# Patient Record
Sex: FEMALE | Race: WHITE | NOT HISPANIC OR LATINO | Employment: FULL TIME | URBAN - METROPOLITAN AREA
[De-identification: names, ages, dates, MRNs, and addresses within clinical notes are randomized per-mention and may not be internally consistent; named-entity substitution may affect disease eponyms.]

---

## 2025-01-27 ENCOUNTER — TELEPHONE (OUTPATIENT)
Dept: PRIMARY CARE | Facility: CLINIC | Age: 45
End: 2025-01-27
Payer: COMMERCIAL

## 2025-01-27 ENCOUNTER — PATIENT MESSAGE (OUTPATIENT)
Dept: PRIMARY CARE | Facility: CLINIC | Age: 45
End: 2025-01-27
Payer: COMMERCIAL

## 2025-01-27 DIAGNOSIS — B00.1 COLD SORE: Primary | ICD-10-CM

## 2025-01-27 RX ORDER — VALACYCLOVIR HYDROCHLORIDE 1 G/1
2000 TABLET, FILM COATED ORAL 2 TIMES DAILY
Qty: 4 TABLET | Refills: 0 | Status: SHIPPED | OUTPATIENT
Start: 2025-01-27 | End: 2025-01-28

## 2025-02-03 ENCOUNTER — PATIENT MESSAGE (OUTPATIENT)
Dept: PRIMARY CARE | Facility: CLINIC | Age: 45
End: 2025-02-03
Payer: COMMERCIAL

## 2025-02-03 DIAGNOSIS — B00.9 HSV-1 INFECTION: Primary | ICD-10-CM

## 2025-02-21 RX ORDER — VALACYCLOVIR HYDROCHLORIDE 1 G/1
1000 TABLET, FILM COATED ORAL 2 TIMES DAILY
Qty: 4 TABLET | Refills: 0 | Status: SHIPPED | OUTPATIENT
Start: 2025-02-21 | End: 2025-02-23

## 2025-04-11 ENCOUNTER — APPOINTMENT (OUTPATIENT)
Dept: PRIMARY CARE | Facility: CLINIC | Age: 45
End: 2025-04-11
Payer: COMMERCIAL

## 2025-04-11 VITALS — HEART RATE: 78 BPM | HEIGHT: 65 IN | BODY MASS INDEX: 25.66 KG/M2 | OXYGEN SATURATION: 98 % | WEIGHT: 154 LBS

## 2025-04-11 DIAGNOSIS — E66.811 CLASS 1 OBESITY DUE TO EXCESS CALORIES WITH SERIOUS COMORBIDITY AND BODY MASS INDEX (BMI) OF 33.0 TO 33.9 IN ADULT: ICD-10-CM

## 2025-04-11 DIAGNOSIS — E66.09 CLASS 1 OBESITY DUE TO EXCESS CALORIES WITH SERIOUS COMORBIDITY AND BODY MASS INDEX (BMI) OF 33.0 TO 33.9 IN ADULT: ICD-10-CM

## 2025-04-11 DIAGNOSIS — E55.9 VITAMIN D DEFICIENCY: ICD-10-CM

## 2025-04-11 DIAGNOSIS — Z12.39 BREAST SCREENING: ICD-10-CM

## 2025-04-11 DIAGNOSIS — Z13.220 LIPID SCREENING: ICD-10-CM

## 2025-04-11 DIAGNOSIS — Z91.89 AT RISK FOR SEXUALLY TRANSMITTED DISEASE DUE TO PARTNER WITH MULTIPLE PARTNERS: ICD-10-CM

## 2025-04-11 DIAGNOSIS — Z13.220 SCREENING FOR LIPOID DISORDERS: ICD-10-CM

## 2025-04-11 DIAGNOSIS — Z13.29 SCREENING FOR THYROID DISORDER: ICD-10-CM

## 2025-04-11 DIAGNOSIS — Z13.0 SCREENING FOR DEFICIENCY ANEMIA: ICD-10-CM

## 2025-04-11 DIAGNOSIS — Z71.3 WEIGHT LOSS COUNSELING, ENCOUNTER FOR: ICD-10-CM

## 2025-04-11 DIAGNOSIS — Z13.1 DIABETES MELLITUS SCREENING: ICD-10-CM

## 2025-04-11 DIAGNOSIS — Z00.00 WELLNESS EXAMINATION: Primary | ICD-10-CM

## 2025-04-11 PROCEDURE — 1036F TOBACCO NON-USER: CPT

## 2025-04-11 PROCEDURE — 99396 PREV VISIT EST AGE 40-64: CPT

## 2025-04-11 PROCEDURE — 3008F BODY MASS INDEX DOCD: CPT

## 2025-04-11 ASSESSMENT — ENCOUNTER SYMPTOMS
SINUS PAIN: 0
CONSTIPATION: 0
UNEXPECTED WEIGHT CHANGE: 0
RHINORRHEA: 0
LIGHT-HEADEDNESS: 0
ABDOMINAL PAIN: 0
HEMATURIA: 0
FACIAL ASYMMETRY: 0
PSYCHIATRIC NEGATIVE: 1
NAUSEA: 0
FATIGUE: 0
JOINT SWELLING: 0
TROUBLE SWALLOWING: 0
DYSURIA: 0
WOUND: 0
WHEEZING: 0
SORE THROAT: 0
ARTHRALGIAS: 0
SINUS PRESSURE: 0
BACK PAIN: 0
FEVER: 0
WEAKNESS: 0
PALPITATIONS: 0
COUGH: 0
SHORTNESS OF BREATH: 0
SEIZURES: 0

## 2025-04-11 ASSESSMENT — PATIENT HEALTH QUESTIONNAIRE - PHQ9
1. LITTLE INTEREST OR PLEASURE IN DOING THINGS: NOT AT ALL
SUM OF ALL RESPONSES TO PHQ9 QUESTIONS 1 AND 2: 0
2. FEELING DOWN, DEPRESSED OR HOPELESS: NOT AT ALL

## 2025-04-11 NOTE — PROGRESS NOTES
Subjective   Patient ID: Ramses Tinoco is a 45 y.o. female who presents for Annual Exam (Annual CPE/Discuss STI/STD testing no symptoms just due to multiple partners /148/89).    Subjective  Ramses Chamorro is a 44 y.o. female here for discussion regarding weight loss. She has noted a weight gain of approximately 10 pounds over the last 2 years. She feels ideal weight is 145 pounds. Weight at graduation from high school was 150 pounds. History of eating disorders: none. There is a family history positive for obesity in the patient. Previous treatments for obesity include self-directed dieting. Obesity associated medical conditions: hypertension. Obesity associated medications: none. Cardiovascular risk factors besides obesity: hypertension and obesity (BMI >= 30 kg/m2).     Objective  Body mass index is 33.8 kg/m².  4/3/24-200lb  7/16/24-168lb  10/22/24-157lb  4/11/25-154lb    Pt is here for annual wellness.  Reports overall health is good    Do you take any herbs or supplements that were not prescribed by a doctor? Cinnamon, air nail skin  Are you taking calcium supplements? no  Are you taking aspirin daily? no  Colonoscopy: uptodate  Fasting blood work: ordered today  Last eye exam: forever  Last dental Exam: need to go  Exercise:not really  Mood:pleasant  Sleep: good  Diet:  well rounded  Occupation:  deliver mail  Do you have pain that bothers you in your daily life? no    1. Patient Counseling:  --Nutrition: Stressed importance of moderation in sodium/caffeine intake, saturated fat and cholesterol, caloric balance, sufficient intake of fresh fruits, vegetables, fiber, calcium, iron, and 1 mg of folate supplement per day (for females capable of pregnancy).  --Discussed the issue of estrogen replacement, calcium supplement, and the daily use of baby aspirin as appropriate per pt.  --Exercise: Stressed the importance of regular exercise.   --Substance Abuse: Discussed cessation/primary prevention of tobacco,  "alcohol, or other drug use; driving or other dangerous activities under the influence; availability of treatment for abuse.    --Sexuality: Discussed sexually transmitted diseases, partner selection, use of condoms, avoidance of unintended pregnancy  and contraceptive alternatives.   --Injury prevention: Discussed safety belts, safety helmets, smoke detector, smoking near bedding or upholstery.   --Dental health: Discussed importance of regular tooth brushing, flossing, and dental visits.  --Immunizations reviewed.  --Discussed benefits of colon cancer screening.  --After hours service discussed with patient  2 Discussed the patient's BMI.  The BMI is above average. The patient received Provided instructions on dietary changes  Provided instructions on exercise because they have an above normal BMI.  3 Follow up as needed for acute illness             Review of Systems   Constitutional:  Negative for fatigue, fever and unexpected weight change.   HENT:  Negative for congestion, ear discharge, ear pain, nosebleeds, postnasal drip, rhinorrhea, sinus pressure, sinus pain, sneezing, sore throat and trouble swallowing.    Respiratory:  Negative for cough, shortness of breath and wheezing.    Cardiovascular:  Negative for chest pain, palpitations and leg swelling.   Gastrointestinal:  Negative for abdominal pain, constipation and nausea.   Genitourinary:  Negative for dysuria, hematuria, menstrual problem, pelvic pain, vaginal bleeding and vaginal pain.   Musculoskeletal:  Negative for arthralgias, back pain, gait problem and joint swelling.   Skin:  Negative for rash and wound.   Neurological:  Negative for seizures, facial asymmetry, weakness and light-headedness.   Psychiatric/Behavioral: Negative.         Objective   Pulse 78   Ht 1.638 m (5' 4.5\")   Wt 69.9 kg (154 lb)   SpO2 98%   BMI 26.03 kg/m²     Physical Exam  Constitutional:       Appearance: Normal appearance.   HENT:      Head: Normocephalic and " atraumatic.      Right Ear: Tympanic membrane and external ear normal.      Left Ear: Tympanic membrane and external ear normal.      Nose: Nose normal.      Mouth/Throat:      Mouth: Mucous membranes are moist.      Pharynx: Oropharynx is clear. Uvula midline.   Eyes:      General: Lids are normal.      Extraocular Movements: Extraocular movements intact.      Pupils: Pupils are equal, round, and reactive to light.   Neck:      Thyroid: No thyromegaly or thyroid tenderness.   Cardiovascular:      Rate and Rhythm: Normal rate and regular rhythm.      Heart sounds: Normal heart sounds.   Pulmonary:      Effort: Pulmonary effort is normal.      Breath sounds: Normal breath sounds.   Abdominal:      General: Bowel sounds are normal.      Palpations: Abdomen is soft.      Tenderness: There is no abdominal tenderness. There is no guarding.   Musculoskeletal:         General: No swelling. Normal range of motion.      Cervical back: Normal range of motion.      Right lower leg: No edema.      Left lower leg: No edema.   Lymphadenopathy:      Head:      Right side of head: No submental, submandibular or tonsillar adenopathy.      Left side of head: No submental, submandibular or tonsillar adenopathy.      Cervical: No cervical adenopathy.   Skin:     General: Skin is warm and dry.      Capillary Refill: Capillary refill takes less than 2 seconds.      Coloration: Skin is not jaundiced.      Findings: No lesion or rash.   Neurological:      General: No focal deficit present.      Mental Status: She is alert and oriented to person, place, and time.   Psychiatric:         Mood and Affect: Mood normal.         Behavior: Behavior normal.         Thought Content: Thought content normal.         Judgment: Judgment normal.         Assessment/Plan   Ramses was seen today for annual exam.  Diagnoses and all orders for this visit:  Wellness examination  Screening for deficiency anemia  -     CBC; Future  -     CBC  Diabetes mellitus  screening  -     Comprehensive Metabolic Panel; Future  -     Comprehensive Metabolic Panel  Screening for thyroid disorder  -     TSH with reflex to Free T4 if abnormal; Future  -     TSH with reflex to Free T4 if abnormal  Lipid screening  -     Lipid Panel; Future  -     Lipid Panel  Screening for lipoid disorders  -     Lipid Panel; Future  -     Lipid Panel  Vitamin D deficiency  -     Vitamin D 25-Hydroxy,Total (for eval of Vitamin D levels); Future  -     Vitamin D 25-Hydroxy,Total (for eval of Vitamin D levels)  Breast screening  -     BI mammo bilateral screening tomosynthesis; Future  At risk for sexually transmitted disease due to partner with multiple partners  -     HIV 1/2 Antigen/Antibody Screen with Reflex to Confirmation; Future  -     Hepatitis C Antibody; Future  -     Syphilis Screen with Reflex; Future  -     C. trachomatis / N. gonorrhoeae, Amplified, Urogenital; Future  -     HIV 1/2 Antigen/Antibody Screen with Reflex to Confirmation  -     Hepatitis C Antibody  -     Syphilis Screen with Reflex  -     C. trachomatis / N. gonorrhoeae, Amplified, Urogenital  Class 1 obesity due to excess calories with serious comorbidity and body mass index (BMI) of 33.0 to 33.9 in adult  BMI 33.0-33.9,adult  Weight loss counseling, encounter for  Comments:  continue zepbound at current dose

## 2025-04-11 NOTE — LETTER
April 11, 2025     Patient: Ramses Tinoco   YOB: 1980   Date of Visit: 4/11/2025       To Whom It May Concern:    Ramses Tinoco was seen in my clinic on 4/11/2025 at 11:20 am. Please excuse Ramses for her absence from work on this day to make the appointment.    If you have any questions or concerns, please don't hesitate to call.         Sincerely,         Radha Glez, JONATAN-CNP        CC: No Recipients

## 2025-04-13 LAB
25(OH)D3+25(OH)D2 SERPL-MCNC: 30 NG/ML (ref 30–100)
ALBUMIN SERPL-MCNC: 4.7 G/DL (ref 3.6–5.1)
ALP SERPL-CCNC: 62 U/L (ref 31–125)
ALT SERPL-CCNC: 10 U/L (ref 6–29)
ANION GAP SERPL CALCULATED.4IONS-SCNC: 10 MMOL/L (CALC) (ref 7–17)
AST SERPL-CCNC: 18 U/L (ref 10–35)
BILIRUB SERPL-MCNC: 0.7 MG/DL (ref 0.2–1.2)
BUN SERPL-MCNC: 14 MG/DL (ref 7–25)
C TRACH RRNA SPEC QL NAA+PROBE: NOT DETECTED
CALCIUM SERPL-MCNC: 9.1 MG/DL (ref 8.6–10.2)
CHLORIDE SERPL-SCNC: 103 MMOL/L (ref 98–110)
CHOLEST SERPL-MCNC: 202 MG/DL
CHOLEST/HDLC SERPL: 2.2 (CALC)
CO2 SERPL-SCNC: 25 MMOL/L (ref 20–32)
CREAT SERPL-MCNC: 0.6 MG/DL (ref 0.5–0.99)
EGFRCR SERPLBLD CKD-EPI 2021: 112 ML/MIN/1.73M2
ERYTHROCYTE [DISTWIDTH] IN BLOOD BY AUTOMATED COUNT: 12.1 % (ref 11–15)
GLUCOSE SERPL-MCNC: 90 MG/DL (ref 65–99)
HCT VFR BLD AUTO: 42.5 % (ref 35–45)
HCV AB SERPL QL IA: NORMAL
HDLC SERPL-MCNC: 90 MG/DL
HGB BLD-MCNC: 14 G/DL (ref 11.7–15.5)
HIV 1+2 AB+HIV1 P24 AG SERPL QL IA: NORMAL
LDLC SERPL CALC-MCNC: 98 MG/DL (CALC)
MCH RBC QN AUTO: 29.7 PG (ref 27–33)
MCHC RBC AUTO-ENTMCNC: 32.9 G/DL (ref 32–36)
MCV RBC AUTO: 90 FL (ref 80–100)
N GONORRHOEA RRNA SPEC QL NAA+PROBE: NOT DETECTED
NONHDLC SERPL-MCNC: 112 MG/DL (CALC)
PLATELET # BLD AUTO: 261 THOUSAND/UL (ref 140–400)
PMV BLD REES-ECKER: 10.3 FL (ref 7.5–12.5)
POTASSIUM SERPL-SCNC: 3.7 MMOL/L (ref 3.5–5.3)
PROT SERPL-MCNC: 7 G/DL (ref 6.1–8.1)
QUEST GC CT AMPLIFIED (ALWAYS MESSAGE): NORMAL
RBC # BLD AUTO: 4.72 MILLION/UL (ref 3.8–5.1)
SODIUM SERPL-SCNC: 138 MMOL/L (ref 135–146)
T PALLIDUM AB SER QL IA: NORMAL
TRIGL SERPL-MCNC: 59 MG/DL
TSH SERPL-ACNC: 0.53 MIU/L
WBC # BLD AUTO: 7.6 THOUSAND/UL (ref 3.8–10.8)

## 2025-04-16 LAB
25(OH)D3+25(OH)D2 SERPL-MCNC: 30 NG/ML (ref 30–100)
ALBUMIN SERPL-MCNC: 4.7 G/DL (ref 3.6–5.1)
ALP SERPL-CCNC: 62 U/L (ref 31–125)
ALT SERPL-CCNC: 10 U/L (ref 6–29)
ANION GAP SERPL CALCULATED.4IONS-SCNC: 10 MMOL/L (CALC) (ref 7–17)
AST SERPL-CCNC: 18 U/L (ref 10–35)
BILIRUB SERPL-MCNC: 0.7 MG/DL (ref 0.2–1.2)
BUN SERPL-MCNC: 14 MG/DL (ref 7–25)
C TRACH RRNA SPEC QL NAA+PROBE: NOT DETECTED
CALCIUM SERPL-MCNC: 9.1 MG/DL (ref 8.6–10.2)
CHLORIDE SERPL-SCNC: 103 MMOL/L (ref 98–110)
CHOLEST SERPL-MCNC: 202 MG/DL
CHOLEST/HDLC SERPL: 2.2 (CALC)
CO2 SERPL-SCNC: 25 MMOL/L (ref 20–32)
CREAT SERPL-MCNC: 0.6 MG/DL (ref 0.5–0.99)
EGFRCR SERPLBLD CKD-EPI 2021: 112 ML/MIN/1.73M2
ERYTHROCYTE [DISTWIDTH] IN BLOOD BY AUTOMATED COUNT: 12.1 % (ref 11–15)
GLUCOSE SERPL-MCNC: 90 MG/DL (ref 65–99)
HCT VFR BLD AUTO: 42.5 % (ref 35–45)
HCV AB SERPL QL IA: NORMAL
HDLC SERPL-MCNC: 90 MG/DL
HGB BLD-MCNC: 14 G/DL (ref 11.7–15.5)
HIV 1+2 AB+HIV1 P24 AG SERPL QL IA: NORMAL
LDLC SERPL CALC-MCNC: 98 MG/DL (CALC)
MCH RBC QN AUTO: 29.7 PG (ref 27–33)
MCHC RBC AUTO-ENTMCNC: 32.9 G/DL (ref 32–36)
MCV RBC AUTO: 90 FL (ref 80–100)
N GONORRHOEA RRNA SPEC QL NAA+PROBE: NOT DETECTED
NONHDLC SERPL-MCNC: 112 MG/DL (CALC)
PLATELET # BLD AUTO: 261 THOUSAND/UL (ref 140–400)
PMV BLD REES-ECKER: 10.3 FL (ref 7.5–12.5)
POTASSIUM SERPL-SCNC: 3.7 MMOL/L (ref 3.5–5.3)
PROT SERPL-MCNC: 7 G/DL (ref 6.1–8.1)
QUEST GC CT AMPLIFIED (ALWAYS MESSAGE): NORMAL
RBC # BLD AUTO: 4.72 MILLION/UL (ref 3.8–5.1)
SODIUM SERPL-SCNC: 138 MMOL/L (ref 135–146)
T PALLIDUM AB SER QL IA: NEGATIVE
TRIGL SERPL-MCNC: 59 MG/DL
TSH SERPL-ACNC: 0.53 MIU/L
WBC # BLD AUTO: 7.6 THOUSAND/UL (ref 3.8–10.8)

## 2025-05-02 ENCOUNTER — APPOINTMENT (OUTPATIENT)
Dept: RADIOLOGY | Facility: CLINIC | Age: 45
End: 2025-05-02
Payer: COMMERCIAL

## 2025-05-05 ENCOUNTER — PATIENT MESSAGE (OUTPATIENT)
Dept: PRIMARY CARE | Facility: CLINIC | Age: 45
End: 2025-05-05
Payer: OTHER GOVERNMENT

## 2025-05-05 DIAGNOSIS — B00.9 HSV-1 INFECTION: Primary | ICD-10-CM

## 2025-05-06 RX ORDER — VALACYCLOVIR HYDROCHLORIDE 1 G/1
1000 TABLET, FILM COATED ORAL 2 TIMES DAILY
Qty: 4 TABLET | Refills: 3 | Status: SHIPPED | OUTPATIENT
Start: 2025-05-06 | End: 2025-05-08

## 2025-05-21 ENCOUNTER — APPOINTMENT (OUTPATIENT)
Dept: PRIMARY CARE | Facility: CLINIC | Age: 45
End: 2025-05-21
Payer: COMMERCIAL

## 2025-05-21 VITALS
OXYGEN SATURATION: 96 % | SYSTOLIC BLOOD PRESSURE: 127 MMHG | HEART RATE: 66 BPM | DIASTOLIC BLOOD PRESSURE: 78 MMHG | HEIGHT: 65 IN | BODY MASS INDEX: 24.32 KG/M2 | WEIGHT: 146 LBS

## 2025-05-21 DIAGNOSIS — L71.9 ACNE ROSACEA: ICD-10-CM

## 2025-05-21 DIAGNOSIS — B00.1 COLD SORE: ICD-10-CM

## 2025-05-21 DIAGNOSIS — I10 PRIMARY HYPERTENSION: ICD-10-CM

## 2025-05-21 DIAGNOSIS — B00.1 HERPES LABIALIS: ICD-10-CM

## 2025-05-21 DIAGNOSIS — F41.9 ANXIETY: Primary | ICD-10-CM

## 2025-05-21 PROCEDURE — 3074F SYST BP LT 130 MM HG: CPT

## 2025-05-21 PROCEDURE — 99214 OFFICE O/P EST MOD 30 MIN: CPT

## 2025-05-21 PROCEDURE — 3078F DIAST BP <80 MM HG: CPT

## 2025-05-21 PROCEDURE — 3008F BODY MASS INDEX DOCD: CPT

## 2025-05-21 RX ORDER — VALACYCLOVIR HYDROCHLORIDE 1 G/1
2 TABLET, FILM COATED ORAL 2 TIMES DAILY PRN
Qty: 12 TABLET | Refills: 3 | Status: SHIPPED | OUTPATIENT
Start: 2025-05-21

## 2025-05-21 RX ORDER — HYDROXYZINE HYDROCHLORIDE 25 MG/1
25 TABLET, FILM COATED ORAL 2 TIMES DAILY
Qty: 60 TABLET | Refills: 0 | Status: SHIPPED | OUTPATIENT
Start: 2025-05-21 | End: 2025-06-20

## 2025-05-21 RX ORDER — METRONIDAZOLE TOPICAL 7.5 MG/G
GEL TOPICAL 2 TIMES DAILY
Qty: 45 G | Refills: 5 | Status: SHIPPED | OUTPATIENT
Start: 2025-05-21 | End: 2026-05-21

## 2025-05-21 ASSESSMENT — ANXIETY QUESTIONNAIRES
2. NOT BEING ABLE TO STOP OR CONTROL WORRYING: NEARLY EVERY DAY
1. FEELING NERVOUS, ANXIOUS, OR ON EDGE: NEARLY EVERY DAY
4. TROUBLE RELAXING: NEARLY EVERY DAY
5. BEING SO RESTLESS THAT IT IS HARD TO SIT STILL: NEARLY EVERY DAY
7. FEELING AFRAID AS IF SOMETHING AWFUL MIGHT HAPPEN: NEARLY EVERY DAY
3. WORRYING TOO MUCH ABOUT DIFFERENT THINGS: NEARLY EVERY DAY
GAD7 TOTAL SCORE: 18
6. BECOMING EASILY ANNOYED OR IRRITABLE: NOT AT ALL

## 2025-05-21 NOTE — PROGRESS NOTES
Subjective   Patient ID: Ramses Tinoco is a 45 y.o. female who presents for Follow-up (Discuss anxiety and BP believes it to be situational/A lot going on at home/PATRICIA 7 score = 18).    Anxiety  Presents for follow-up visit. Symptoms include chest pain, decreased concentration, excessive worry, feeling of choking, irritability, nervous/anxious behavior, palpitations and panic. Patient reports no confusion, dizziness, dry mouth, insomnia, restlessness or suicidal ideas. Symptoms occur most days. The severity of symptoms is causing significant distress. The quality of sleep is fair. Nighttime awakenings: occasional.       Mouth Lesions   The current episode started more than 1 week ago. The onset was sudden. The problem occurs occasionally. Progression since onset: comes and goes aware that stress increases it. The problem is moderate. The symptoms are relieved by one or more prescription drugs. Exacerbated by: stress. Associated symptoms include mouth sores and rash. Pertinent negatives include no decreased vision, no congestion, no sore throat and no eye pain.   Rash  This is a new problem. The current episode started more than 1 month ago. The problem has been gradually worsening since onset. The affected locations include the face. The rash is characterized by blistering and redness. She was exposed to nothing. Pertinent negatives include no anorexia, congestion, eye pain, facial edema, fatigue or sore throat. Past treatments include nothing.    Pt aware of precipitating factors, having marital issues, feels this is in better control now    Review of Systems   Constitutional:  Positive for irritability. Negative for fatigue.   HENT:  Positive for mouth sores. Negative for congestion and sore throat.    Eyes:  Negative for pain.   Cardiovascular:  Positive for chest pain and palpitations.   Gastrointestinal:  Negative for anorexia.   Skin:  Positive for rash.   Neurological:  Negative for dizziness.  "  Psychiatric/Behavioral:  Positive for decreased concentration. Negative for confusion and suicidal ideas. The patient is nervous/anxious. The patient does not have insomnia.        Objective   /78   Pulse 66   Ht 1.638 m (5' 4.5\")   Wt 66.2 kg (146 lb)   SpO2 96%   BMI 24.67 kg/m²     Physical Exam  Constitutional:       General: She is not in acute distress.     Appearance: Normal appearance. She is not ill-appearing.   HENT:      Head:     Cardiovascular:      Heart sounds: Normal heart sounds.   Pulmonary:      Effort: Pulmonary effort is normal.      Breath sounds: Normal breath sounds.   Skin:     Findings: Erythema, lesion and rash present.   Neurological:      Mental Status: She is alert and oriented to person, place, and time.   Psychiatric:         Mood and Affect: Mood normal.         Behavior: Behavior normal.         Thought Content: Thought content normal.         Assessment/Plan   Ramses was seen today for follow-up.  Diagnoses and all orders for this visit:  Anxiety  -     hydrOXYzine HCL (Atarax) 25 mg tablet; Take 1 tablet (25 mg) by mouth 2 times a day.  Herpes labialis  -     valACYclovir (Valtrex) 1 gram tablet; Take 2 tablets (2,000 mg) by mouth 2 times a day as needed (outbreak). Use a total of 4 tablets for each outbreak  Cold sore  -     valACYclovir (Valtrex) 1 gram tablet; Take 2 tablets (2,000 mg) by mouth 2 times a day as needed (outbreak). Use a total of 4 tablets for each outbreak  Primary hypertension  Comments:  bp good today  Acne rosacea  -     metroNIDAZOLE (Metrogel) 0.75 % gel; Apply topically 2 times a day. Apply twice daily to rosacea           "

## 2025-05-22 ASSESSMENT — ENCOUNTER SYMPTOMS
DIZZINESS: 0
NERVOUS/ANXIOUS: 1
RESTLESSNESS: 0
PALPITATIONS: 1
FEELING OF CHOKING: 1
INSOMNIA: 0
IRRITABILITY: 1
DECREASED CONCENTRATION: 1
EYE PAIN: 0
CONFUSION: 0
ANOREXIA: 0
PANIC: 1
SORE THROAT: 0
FATIGUE: 0

## 2025-05-30 ENCOUNTER — HOSPITAL ENCOUNTER (OUTPATIENT)
Dept: RADIOLOGY | Facility: CLINIC | Age: 45
End: 2025-05-30
Payer: COMMERCIAL

## 2025-06-06 ENCOUNTER — HOSPITAL ENCOUNTER (OUTPATIENT)
Dept: RADIOLOGY | Facility: CLINIC | Age: 45
Discharge: HOME | End: 2025-06-06
Payer: COMMERCIAL

## 2025-06-06 VITALS — BODY MASS INDEX: 25.61 KG/M2 | HEIGHT: 64 IN | WEIGHT: 150 LBS

## 2025-06-06 DIAGNOSIS — Z12.39 BREAST SCREENING: ICD-10-CM

## 2025-06-06 PROCEDURE — 77063 BREAST TOMOSYNTHESIS BI: CPT

## 2025-06-10 ENCOUNTER — HOSPITAL ENCOUNTER (OUTPATIENT)
Dept: RADIOLOGY | Facility: EXTERNAL LOCATION | Age: 45
Discharge: HOME | End: 2025-06-10

## 2025-07-15 ENCOUNTER — APPOINTMENT (OUTPATIENT)
Dept: PRIMARY CARE | Facility: CLINIC | Age: 45
End: 2025-07-15
Payer: COMMERCIAL

## 2025-07-15 VITALS
HEART RATE: 58 BPM | DIASTOLIC BLOOD PRESSURE: 76 MMHG | SYSTOLIC BLOOD PRESSURE: 155 MMHG | BODY MASS INDEX: 26.43 KG/M2 | WEIGHT: 154 LBS | OXYGEN SATURATION: 97 %

## 2025-07-15 DIAGNOSIS — E66.09 CLASS 1 OBESITY DUE TO EXCESS CALORIES WITH SERIOUS COMORBIDITY AND BODY MASS INDEX (BMI) OF 33.0 TO 33.9 IN ADULT: ICD-10-CM

## 2025-07-15 DIAGNOSIS — E66.811 CLASS 1 OBESITY DUE TO EXCESS CALORIES WITH SERIOUS COMORBIDITY AND BODY MASS INDEX (BMI) OF 33.0 TO 33.9 IN ADULT: ICD-10-CM

## 2025-07-15 DIAGNOSIS — Z71.3 WEIGHT LOSS COUNSELING, ENCOUNTER FOR: ICD-10-CM

## 2025-07-15 DIAGNOSIS — Z72.51 HIGH RISK HETEROSEXUAL BEHAVIOR: Primary | ICD-10-CM

## 2025-07-15 PROCEDURE — 99213 OFFICE O/P EST LOW 20 MIN: CPT

## 2025-07-15 PROCEDURE — 3077F SYST BP >= 140 MM HG: CPT

## 2025-07-15 PROCEDURE — 3078F DIAST BP <80 MM HG: CPT

## 2025-07-15 ASSESSMENT — ENCOUNTER SYMPTOMS
VOMITING: 0
RECTAL PAIN: 0
FEVER: 0
DIARRHEA: 0
BLOOD IN STOOL: 0
ACTIVITY CHANGE: 0
ABDOMINAL DISTENTION: 0
APPETITE CHANGE: 0
UNEXPECTED WEIGHT CHANGE: 0
CONSTIPATION: 0
PALPITATIONS: 0

## 2025-07-15 NOTE — PROGRESS NOTES
Subjective   Patient ID: Ramses Tinoco is a 45 y.o. female who presents for Weight Check.    Subjective  Ramses Chamorro is a 44 y.o. female here for discussion regarding weight loss. She has noted a weight gain of approximately 10 pounds over the last 2 years. She feels ideal weight is 145 pounds. Weight at graduation from high school was 150 pounds. History of eating disorders: none. There is a family history positive for obesity in the patient. Previous treatments for obesity include self-directed dieting. Obesity associated medical conditions: hypertension. Obesity associated medications: none. Cardiovascular risk factors besides obesity: hypertension and obesity (BMI >= 30 kg/m2).     Objective  Body mass index is 33.8 kg/m².  4/3/24-200lb  7/16/24-168lb  10/22/24-157lb  4/11/25-154lb  7/15/25-154lb    Of note pt also requesting STI testing due to high risk lifestyle         Review of Systems   Constitutional:  Negative for activity change, appetite change, fever and unexpected weight change.   Cardiovascular:  Negative for chest pain, palpitations and leg swelling.   Gastrointestinal:  Negative for abdominal distention, blood in stool, constipation, diarrhea, rectal pain and vomiting.       Objective   /76   Pulse 58   Wt 69.9 kg (154 lb)   SpO2 97%   BMI 26.43 kg/m²     Physical Exam  Constitutional:       General: She is not in acute distress.     Appearance: Normal appearance. She is not ill-appearing.   Cardiovascular:      Heart sounds: Normal heart sounds.   Neurological:      Mental Status: She is alert and oriented to person, place, and time.   Psychiatric:         Mood and Affect: Mood normal.         Behavior: Behavior normal.         Thought Content: Thought content normal.         Judgment: Judgment normal.         Assessment/Plan   Ramses was seen today for weight check.  Diagnoses and all orders for this visit:  High risk heterosexual behavior  -     HIV 1/2 Antigen/Antibody Screen  with Reflex to Confirmation; Future  -     Cancel: C. trachomatis / N. gonorrhoeae, Amplified, Urogenital  -     Syphilis Screen with Reflex; Future  -     Trichomonas vaginalis, Amplified; Future  -     HIV 1/2 Antigen/Antibody Screen with Reflex to Confirmation  -     Syphilis Screen with Reflex  -     Trichomonas vaginalis, Amplified  -     C. trachomatis / N. gonorrhoeae, Amplified, Urogenital; Future  -     C. trachomatis / N. gonorrhoeae, Amplified, Urogenital  Class 1 obesity due to excess calories with serious comorbidity and body mass index (BMI) of 33.0 to 33.9 in adult  BMI 33.0-33.9,adult  Weight loss counseling, encounter for  Comments:  has maintained weight loss, she is going to cut back on zepbound to once per month

## 2025-07-17 LAB
C TRACH RRNA SPEC QL NAA+PROBE: NOT DETECTED
HIV 1+2 AB+HIV1 P24 AG SERPL QL IA: NORMAL
HIV 1+2 AB+HIV1 P24 AG SERPL QL IA: NORMAL
N GONORRHOEA RRNA SPEC QL NAA+PROBE: NOT DETECTED
QUEST GC CT AMPLIFIED (ALWAYS MESSAGE): NORMAL
T PALLIDUM AB SER QL IA: NEGATIVE
T VAGINALIS RRNA SPEC QL NAA+PROBE: NOT DETECTED

## 2025-10-17 ENCOUNTER — APPOINTMENT (OUTPATIENT)
Dept: PRIMARY CARE | Facility: CLINIC | Age: 45
End: 2025-10-17
Payer: COMMERCIAL